# Patient Record
Sex: FEMALE | Race: WHITE | URBAN - METROPOLITAN AREA
[De-identification: names, ages, dates, MRNs, and addresses within clinical notes are randomized per-mention and may not be internally consistent; named-entity substitution may affect disease eponyms.]

---

## 2021-07-28 ENCOUNTER — HOSPITAL ENCOUNTER (OUTPATIENT)
Dept: PHYSICAL THERAPY | Age: 51
Discharge: HOME | End: 2021-07-28
Payer: COMMERCIAL

## 2021-07-28 PROCEDURE — 97110 THERAPEUTIC EXERCISES: CPT

## 2021-07-28 PROCEDURE — 97161 PT EVAL LOW COMPLEX 20 MIN: CPT

## 2021-07-28 PROCEDURE — 97140 MANUAL THERAPY 1/> REGIONS: CPT

## 2021-07-28 NOTE — THERAPY EVALUATION
Silverio  : 1970  Primary: Sanjeev Ortega Of Renea Gamboa*  Secondary:  [facility]  [address], [address]  [phone]   [phone]          OUTPATIENT PHYSICAL THERAPY:  Initial Assessment 2021   ICD-10: Treatment Diagnosis: M25.512  Pain in L shoulder  M25.612  Stiffness in L shoulder  M75.02  Adhesive capsulitis L shoulder  Precautions/Allergies:   Patient has no allergy information on record. TREATMENT PLAN:  Effective Dates: 2021 TO 10/26/2021 (90 days). Frequency/Duration: 1 time a week for 60 Day(s), and upon reassessment will adjust frequency and duration as progress indicates. MEDICAL/REFERRING DIAGNOSIS:  Sprain of left rotator cuff capsule [S43.422A]   DATE OF ONSET: about 4-5 months ago  REFERRING PHYSICIAN: Ross Aase, MD MD Orders: Jamal Guardado and Treat    Return MD Appointment: TBD     INITIAL ASSESSMENT:  Ms. Annmarie Johansen presents with symptoms consistent with adhesive capsulitis of the L shoulder. She had a cortisone injection 2-3 weeks ago with minimal improvement. She shows significant limitation of flexion and rotations, and has pain with trying to raise the arm above shoulder level or lifting anything. She is expected to benefit from skilled PT to help improve function and ROM and to decrease pain. PROBLEM LIST (Impacting functional limitations):  1. Decreased Strength  2. Decreased ADL/Functional Activities  3. Increased Pain  4. Decreased Activity Tolerance  5. Decreased Flexibility/Joint Mobility  6. Decreased [place] with Home Exercise Program  7. INTERVENTIONS PLANNED: (Treatment may consist of any combination of the following)  1. Home Exercise Program (HEP)  2. Manual Therapy  3. Neuromuscular Re-education/Strengthening  4. Range of Motion (ROM)  5. Therapeutic Exercise/Strengthening  6. Modalities as needed and appropriate, including Aquatics and taping  7.        GOALS: (Goals have been discussed and agreed upon with patient.)  Short-Term Functional Goals: Time Frame: 3 weeks  Increase   Active Lshoulder flexion ROM to >140 deg for overhead activities. Decrease pain level by 2/10 for reaching and lifting ADL's  [place] with HEP with minimal cueing    DISCHARGE GOALS: Time Frame: 12 weeks  1. Restore full ROM L shoulder for performance of all UE ADL's  2. Patient to report minimal levels of pain with normal ADL's  3   Increase strength to at least 4/5 in all muscle groups for return to recreational activities  4. Improve score on DASH by > 8 points for ease with dressing and lifting ADL's  5   [place] with advanced HEP with no cueing    OUTCOME MEASURE:   OUTCOME: AZRA Shoulder Score  Initial Score: 33/100 Most Recent: X/100 (Date: XX/XX)   Interpretation of Score: 20 questions each scored on a 3 point scale with 0 representing \"extreme difficulty or unable to perform\" and 3 representing \"no difficulty\", 3 questions each scored from 0-10 about pain, and 1 question scored 0-10 about function of the shoulder  The lower the score, the greater the functional disability. 100/100 represents no disability, pain or loss of function. Minimal clinically important difference is 11.4. Minimal detectable change is 12.1. MEDICAL NECESSITY:   · Patient is expected to demonstrate progress in strength, range of motion and functional technique  ·  to help restore function for L shoulder. · .  REASON FOR SERVICES/OTHER COMMENTS:  · Patient continues to require skilled intervention due to severe ROM limitations  · . Total Duration:  PT Patient Time In/Time Out  Time In: 0240  Time Out: 0320    Rehabilitation Potential For Stated Goals: Good  Regarding Rosa Maria Sawyer's therapy, I certify that the treatment plan above will be carried out by a therapist or under their direction.   Thank you for this referral,  Jhoana Forman, PT     Referring Physician Signature: Catalino Rosa MD No Signature is Required for this note. Information below was gathered on Initial Assessment--   7-28  PAIN/SUBJECTIVE:   Initial: Pain Intensity 1: 6  Post Session:  6/10   HISTORY:   History of Injury/Illness (Reason for Referral):  Pt started developing L shoulder pain due to unknown reason in maybe late January. She rested the arm and tried to let it heal and that did not make any difference with her pain. She eventually saw the dr, and had cortisone injection about 3 weeks ago, with minimal improvement. She had MRI that r/o any tear or pathology other than adhesive capsulitis and inflammation. She is R handed. She does occasionally have pain at night that interferes with sleep but she is comfortable sleeping on the R shoulder. Since learning of MRI results she has been trying to move the arm more and it seems to be feeling a little better. Past Medical History/Comorbidities:   Ms. Sneha James  has no past medical history on file. Ms. Sneha James  has no past surgical history on file. Social History/Living Environment:     . Prior Level of Function/Work/Activity:  Is not employed. Is a regular exerciser at [employer] .    Dominant Side:         RIGHT    Personal Factors:          Age:  48 y.o. Ambulatory/Rehab Services H2 Model Falls Risk Assessment   Risk Factors:       No Risk Factors Identified Ability to Rise from Chair:       (0)  Ability to rise in a single movement   Falls Prevention Plan:       No modifications necessary   Total: (5 or greater = High Risk): 0   ©2010 [facility]. All Rights Reserved. [facility] States Patent #[id number]. Federal Law prohibits the replication, distribution or use without written permission from [facility] [employer]   Current Medications:     No current outpatient medications on file.    Date Last Reviewed:  7/28/2021     Number of Personal Factors/Comorbidities that affect the Plan of Care: 1-2: MODERATE COMPLEXITY   EXAMINATION:   Observation:  Pleasant, active -appearing 47 yo female. Does move the L arm spontaneously and without difficulty when below shoulder height. ROM:          Active flexion in sitting to 125 °   Can reach hand to ~ C 6-7. Hand behind back to thumb to back pocket. Passive ROM in supine:   Flex= 150 ° ,   ER in scaption= 50 ° , IR in scaption= 55 °     Strength:          L shoulder not tested due to ROM limitations    Neurological Screen:        Sensation: pt denies any sensory changes in the arm or hand, no tingling     Functional Mobility:         Gait/Ambulation:  Not affected by this dx        Stairs:  Not affected by this dx         Balance:          NT             Body Structures Involved:  1. Bones  2. Joints  3. Muscles  4. Ligaments Body Functions Affected:  1. Mental  2. Sensory/Pain  3. Neuromusculoskeletal  4. Movement Related Activities and Participation Affected:  1. General Tasks and Demands  2. Self Care  3. Domestic Life  4.  Community, Social and [place]   Number of elements (examined above) that affect the Plan of Care: 1-2: LOW COMPLEXITY   CLINICAL PRESENTATION:   Presentation: Stable and uncomplicated: LOW COMPLEXITY   CLINICAL DECISION MAKING:   Use of outcome tool(s) and clinical judgement create a POC that gives a: Clear prediction of patient's progress: LOW COMPLEXITY

## 2021-07-28 NOTE — PROGRESS NOTES
Silverio  : 1970  Primary: Javier Wilcox Of [facility]*  Secondary:  [facility]  [address], [facility] [address]  [phone]   [phone]        OUTPATIENT PHYSICAL THERAPY: Daily Treatment Note 2021  Visit Count:  1      ICD-10: Treatment Diagnosis:       TREATMENT PLAN:  Effective Dates: 2021 TO 10/26/2021     Pre-treatment Symptoms/Complaints:  Pt reports persistent L shoulder pain, [place] with any attempts to raise the arm overhead or out, or lift anything of much weight. Pain: Initial: Pain Intensity 1: 6 /10 Post Session:  6/10   Medications Last Reviewed:  2021  Updated Objective Findings:  See evaluation note from today  TREATMENT:     Evaluation (X)    Therapeutic Exercise   (10 min   ):  To decrease pain, improve flexibility and motion, and increase strength. Will provide verbal and manual cues as needed to ensure proper performance of the exercises. Will increase range of motion, resistance and intensity as pt tolerates. Pulley x ~ 3-4 min  Standing rows with L arm, red band  Shoulder extn--red band  ER, isometric with a step  IR against red band    Manual Therapy (  20 min ):  Glenohumeral mobs, capsule stretching, PROM and soft tissue mobs for the L shoulder in supine. [employer] Portal  Treatment/Session Summary:    · Response to Treatment:  Pt tolerated therapy well today, in spite of the discomfort of the passive stretching. She is a regular exerciser and seems intent on working to help return the arm to good functional use without pain. She is most limited functionally in ER and IR and emphasis in treatment will be on rotations.     · .  · Communication/Consultation:  None today  · Equipment provided today:  Pulley and red theraband for ex  · Recommendations/Intent for next treatment session: Next visit will focus on improving passive and active ROM L shoulder and promoting return to normal L shoulder function.   .    Total Treatment Billable Duration: 45 min   Eval,  TE 1 MT 1     Effective Dates: 7/28/2021 TO 10/26/2021    PT Patient Time In/Time Out  Time In: 2083  Time Out: [address] [place] Ben Bolt, PT    Future Appointments   Date Time Provider Kristy Bo   8/4/2021  2:30 PM Nayeli Henson PT [facility]   8/19/2021  2:00 PM Leslye Wilkes MD [facility]

## 2021-08-04 ENCOUNTER — HOSPITAL ENCOUNTER (OUTPATIENT)
Dept: PHYSICAL THERAPY | Age: 51
Discharge: HOME | End: 2021-08-04
Payer: COMMERCIAL

## 2021-08-04 PROCEDURE — 97110 THERAPEUTIC EXERCISES: CPT

## 2021-08-04 PROCEDURE — 97140 MANUAL THERAPY 1/> REGIONS: CPT

## 2021-08-04 NOTE — PROGRESS NOTES
Silverio  : 1970  Primary: Javier Wilcox Of Renea Gamboa*  Secondary:  [facility] [address]  [address], [address]  [phone]   [phone]        OUTPATIENT PHYSICAL THERAPY: Daily Treatment Note 2021  Visit Count:  2       ICD-10: Treatment Diagnosis: M25.512  Pain in L shoulder  M25.612  Stiffness in L shoulder  M75.02  Adhesive capsulitis L shoulder    TREATMENT PLAN:  Effective Dates: 2021 TO 10/26/2021     Pre-treatment Symptoms/Complaints:  Pt reports that her shoulder ins noticeably better. Sleeping better. Has been compliant with pulley and stretching at  Home. Pain: Initial: Pain Intensity 1: 4 /10 Post Session:  3/10   Medications Last Reviewed:  2021  Updated Objective Findings:    TREATMENT:     Therapeutic Exercise   (20 min   ):  To decrease pain, improve flexibility and motion, and increase strength. Will provide verbal and manual cues as needed to ensure proper performance of the exercises. Will increase range of motion, resistance and intensity as pt tolerates. UBE x 6 min  Shoulder alphabet, supine-- 1#  Overhead flexion, supine--1#--1 x 10  Passive ER and IR stretch, 1#, arm at ~ 80 ° abd  Stretching in standing with dowel  AROM--arms overhead, hands top of head, behind head, elbows together/ apart, underwater swimming motion        Manual Therapy (  25 min ):  Glenohumeral mobs, capsule stretching, PROM and soft tissue mobs for the L shoulder in supine. [facility] Portal  Treatment/Session Summary:    · Response to Treatment:  Pt tolerated therapy well today, in spite of the discomfort of the passive stretching. She tolerated more PROM today than last week. She is a regular exerciser and seems intent on working to help return the arm to good functional use without pain. She has been compliant with pulley and stretching, and her pain is less and her function better.    She is most limited functionally in ER and IR and emphasis in treatment will be on rotations. Decided to not make more appts at present; she will work on her own and return if she plateaus or needs more PT. · .  · Communication/Consultation:  None today  · Equipment provided today:  Pulley and red theraband for ex  · Recommendations/Intent for next treatment session: Next visit will focus on improving passive and active ROM L shoulder and promoting return to normal L shoulder function.   .    Total Treatment Billable Duration: 45 min    TE 1 MT 2     Effective Dates: 7/28/2021 TO 10/26/2021    PT Patient Time In/Time Out  Time In: 0230  Time Out: [place] 1, PT    Future Appointments   Date Time Provider Kristy Bo   8/19/2021  2:00 PM Neptali Hudson MD [facility]

## 2021-08-19 PROBLEM — R90.82 WHITE MATTER ABNORMALITY ON MRI OF BRAIN: Status: ACTIVE | Noted: 2021-08-19

## 2021-08-19 PROBLEM — G43.009 MIXED COMMON MIGRAINE AND MUSCLE CONTRACTION HEADACHE: Status: ACTIVE | Noted: 2021-08-19

## 2021-08-19 PROBLEM — G44.209 MIXED COMMON MIGRAINE AND MUSCLE CONTRACTION HEADACHE: Status: ACTIVE | Noted: 2021-08-19

## 2021-12-29 NOTE — THERAPY DISCHARGE
Rosa Maria Sawyer  : 1970  Primary: Anaid Raphaelóscar Of Renea [facility]*  Secondary:  [facility]  [address], [address]  [phone]   [phone]          OUTPATIENT PHYSICAL THERAPY: Discharge Summary   ICD-10: Treatment Diagnosis: M25.512  Pain in L shoulder  M25.612  Stiffness in L shoulder  M75.02  Adhesive capsulitis L shoulder  Precautions/Allergies:   Patient has no allergy information on record. TREATMENT PLAN:   Frequency/Duration: Pt has been DC'd from PT.   MEDICAL/REFERRING DIAGNOSIS:  Sprain of left rotator cuff capsule [S43.422A]   DATE OF ONSET: about 4-5 months ago  REFERRING PHYSICIAN: Theodore Harrington MD MD Orders: Gracy Sandhoff and Treat    Return MD Appointment: TBD     INITIAL ASSESSMENT:  Ms. Sylvie Herrera presents with symptoms consistent with adhesive capsulitis of the L shoulder. She had a cortisone injection 2-3 weeks ago with minimal improvement. She shows significant limitation of flexion and rotations, and has pain with trying to raise the arm above shoulder level or lifting anything. She is expected to benefit from skilled PT to help improve function and ROM and to decrease pain. DISCHARGE SUMMARY:  Pt attended 2 visits to PT for L shoulder pain and stiffness. She showed improvement from 1 visit to the next, and she was pleased. She was compliant with HEP, and reported to be a regular exerciser. She was taught comprehensive HEP including stretching and strengthening, and at last visit on , she reported that she wanted to continue her recovery on her own, with what she had been taught. Would only return to therapy if she plateaued or needed more instruction or treatment. Pt has not been seen since , and so is DC'd from PT.     1.  1.      GOALS: (Goals have been discussed and agreed upon with patient.)  Short-Term Functional Goals: Time Frame: 3 weeks-- goals not reassessed.    Increase   Active Lshoulder flexion ROM to >140 deg for overhead activities. Decrease pain level by 2/10 for reaching and lifting ADL's  [place] with HEP with minimal cueing    DISCHARGE GOALS: Time Frame: 12 weeks  1. Restore full ROM L shoulder for performance of all UE ADL's  2. Patient to report minimal levels of pain with normal ADL's  3   Increase strength to at least 4/5 in all muscle groups for return to recreational activities  4. Improve score on DASH by > 8 points for ease with dressing and lifting ADL's  5   [place] with advanced HEP with no cueing    OUTCOME MEASURE:   OUTCOME: AZRA Shoulder Score  Initial Score: 33/100 Most Recent: X/100 (Date: XX/XX)   Interpretation of Score: 20 questions each scored on a 3 point scale with 0 representing \"extreme difficulty or unable to perform\" and 3 representing \"no difficulty\", 3 questions each scored from 0-10 about pain, and 1 question scored 0-10 about function of the shoulder  The lower the score, the greater the functional disability. 100/100 represents no disability, pain or loss of function. Minimal clinically important difference is 11.4. Minimal detectable change is 12.1. Jossie Yan, PT                                                                                                          Information below was gathered on Initial Assessment--   7-28  PAIN/SUBJECTIVE:   Initial: Pain Intensity 1: 6  Post Session:  6/10   HISTORY:   History of Injury/Illness (Reason for Referral):  Pt started developing L shoulder pain due to unknown reason in maybe late January. She rested the arm and tried to let it heal and that did not make any difference with her pain. She eventually saw the dr, and had cortisone injection about 3 weeks ago, with minimal improvement. She had MRI that r/o any tear or pathology other than adhesive capsulitis and inflammation. She is R handed.   She does occasionally have pain at night that interferes with sleep but she is comfortable sleeping on the R shoulder. Since learning of MRI results she has been trying to move the arm more and it seems to be feeling a little better. Past Medical History/Comorbidities:   Ms. 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 Ms. Misha Pelayo  has no past surgical history on file. Social History/Living Environment:     . Prior Level of Function/Work/Activity:  Is not employed. Is a regular exerciser at [employer] .    Dominant Side:         RIGHT    Personal Factors:          Age:  46 y.o. Ambulatory/Rehab Services H2 Model Falls Risk Assessment   Risk Factors:       No Risk Factors Identified Ability to Rise from Chair:       (0)  Ability to rise in a single movement   Falls Prevention Plan:       No modifications necessary   Total: (5 or greater = High Risk): 0   ©2010 [facility] [place]. All Rights Reserved. [facility] Patent #[id number]. Federal Law prohibits the replication, distribution or use without written permission from [facility] [employer]   Current Medications:       Current Outpatient Medications:     rimegepant (Nurtec ODT) 75 mg disintegrating tablet, Take 75 mg by mouth once as needed for Migraine. , Disp: , Rfl:     ibuprofen (MOTRIN) 200 mg tablet, Take 800 mg by mouth every six (6) hours as needed for Pain., Disp: , Rfl:     multivitamin (ONE A DAY) tablet, Take 1 Tablet by mouth daily. , Disp: , Rfl:    Date Last Reviewed:  12/29/2021     Number of Personal Factors/Comorbidities that affect the Plan of Care: 1-2: MODERATE COMPLEXITY   EXAMINATION:   Observation:  Pleasant, active -appearing 49 yo female. Does move the L arm spontaneously and without difficulty when below shoulder height. ROM:          Active flexion in sitting to 125 °   Can reach hand to ~ C 6-7. Hand behind back to thumb to back pocket.       Passive ROM in supine:   Flex= 150 ° ,   ER in scaption= 50 ° , IR in scaption= 55 °     Strength:          L shoulder not tested due to ROM limitations    Neurological Screen:        Sensation: pt denies any sensory changes in the arm or hand, no tingling     Functional Mobility:         Gait/Ambulation:  Not affected by this dx        Stairs:  Not affected by this dx         Balance:          NT             Body Structures Involved:  1. Bones  2. Joints  3. Muscles  4. Ligaments Body Functions Affected:  1. Mental  2. Sensory/Pain  3. Neuromusculoskeletal  4. Movement Related Activities and Participation Affected:  1. General Tasks and Demands  2. Self Care  3. Domestic Life  4.  Community, Social and [place]   Number of elements (examined above) that affect the Plan of Care: 1-2: LOW COMPLEXITY   CLINICAL PRESENTATION:   Presentation: Stable and uncomplicated: LOW COMPLEXITY   CLINICAL DECISION MAKING:   Use of outcome tool(s) and clinical judgement create a POC that gives a: Clear prediction of patient's progress: LOW COMPLEXITY

## 2022-03-18 PROBLEM — G43.009 MIXED COMMON MIGRAINE AND MUSCLE CONTRACTION HEADACHE: Status: ACTIVE | Noted: 2021-08-19

## 2022-03-18 PROBLEM — G44.209 MIXED COMMON MIGRAINE AND MUSCLE CONTRACTION HEADACHE: Status: ACTIVE | Noted: 2021-08-19

## 2022-03-19 PROBLEM — R90.82 WHITE MATTER ABNORMALITY ON MRI OF BRAIN: Status: ACTIVE | Noted: 2021-08-19

## 2022-07-07 ENCOUNTER — TELEPHONE (OUTPATIENT)
Dept: NEUROLOGY | Age: 52
End: 2022-07-07